# Patient Record
Sex: MALE | Race: BLACK OR AFRICAN AMERICAN | NOT HISPANIC OR LATINO | Employment: FULL TIME | URBAN - METROPOLITAN AREA
[De-identification: names, ages, dates, MRNs, and addresses within clinical notes are randomized per-mention and may not be internally consistent; named-entity substitution may affect disease eponyms.]

---

## 2023-06-21 LAB
ALANINE AMINOTRANSFERASE (SGPT) (U/L) IN SER/PLAS: 24 U/L (ref 10–52)
ALBUMIN (G/DL) IN SER/PLAS: 4.3 G/DL (ref 3.4–5)
ALKALINE PHOSPHATASE (U/L) IN SER/PLAS: 53 U/L (ref 33–120)
ANION GAP IN SER/PLAS: 13 MMOL/L (ref 10–20)
ASPARTATE AMINOTRANSFERASE (SGOT) (U/L) IN SER/PLAS: 22 U/L (ref 9–39)
BILIRUBIN TOTAL (MG/DL) IN SER/PLAS: 0.9 MG/DL (ref 0–1.2)
CALCIDIOL (25 OH VITAMIN D3) (NG/ML) IN SER/PLAS: 10 NG/ML
CALCIUM (MG/DL) IN SER/PLAS: 9.5 MG/DL (ref 8.6–10.6)
CARBON DIOXIDE, TOTAL (MMOL/L) IN SER/PLAS: 29 MMOL/L (ref 21–32)
CHLORIDE (MMOL/L) IN SER/PLAS: 101 MMOL/L (ref 98–107)
CHOLESTEROL (MG/DL) IN SER/PLAS: 169 MG/DL (ref 0–199)
CHOLESTEROL IN HDL (MG/DL) IN SER/PLAS: 58.2 MG/DL
CHOLESTEROL/HDL RATIO: 2.9
CREATININE (MG/DL) IN SER/PLAS: 1.19 MG/DL (ref 0.5–1.3)
ERYTHROCYTE DISTRIBUTION WIDTH (RATIO) BY AUTOMATED COUNT: 15.3 % (ref 11.5–14.5)
ERYTHROCYTE MEAN CORPUSCULAR HEMOGLOBIN CONCENTRATION (G/DL) BY AUTOMATED: 32.1 G/DL (ref 32–36)
ERYTHROCYTE MEAN CORPUSCULAR VOLUME (FL) BY AUTOMATED COUNT: 98 FL (ref 80–100)
ERYTHROCYTES (10*6/UL) IN BLOOD BY AUTOMATED COUNT: 4.84 X10E12/L (ref 4.5–5.9)
GFR MALE: 71 ML/MIN/1.73M2
GLUCOSE (MG/DL) IN SER/PLAS: 108 MG/DL (ref 74–99)
HEMATOCRIT (%) IN BLOOD BY AUTOMATED COUNT: 47.4 % (ref 41–52)
HEMOGLOBIN (G/DL) IN BLOOD: 15.2 G/DL (ref 13.5–17.5)
LDL: 97 MG/DL (ref 0–99)
LEUKOCYTES (10*3/UL) IN BLOOD BY AUTOMATED COUNT: 7.8 X10E9/L (ref 4.4–11.3)
NRBC (PER 100 WBCS) BY AUTOMATED COUNT: 0 /100 WBC (ref 0–0)
PLATELETS (10*3/UL) IN BLOOD AUTOMATED COUNT: 213 X10E9/L (ref 150–450)
POTASSIUM (MMOL/L) IN SER/PLAS: 4 MMOL/L (ref 3.5–5.3)
PROSTATE SPECIFIC AG (NG/ML) IN SER/PLAS: 0.27 NG/ML (ref 0–4)
PROTEIN TOTAL: 7.6 G/DL (ref 6.4–8.2)
SODIUM (MMOL/L) IN SER/PLAS: 139 MMOL/L (ref 136–145)
THYROTROPIN (MIU/L) IN SER/PLAS BY DETECTION LIMIT <= 0.05 MIU/L: 0.95 MIU/L (ref 0.44–3.98)
TRIGLYCERIDE (MG/DL) IN SER/PLAS: 67 MG/DL (ref 0–149)
UREA NITROGEN (MG/DL) IN SER/PLAS: 15 MG/DL (ref 6–23)
VLDL: 13 MG/DL (ref 0–40)

## 2023-07-07 LAB
ACTIVATED PARTIAL THROMBOPLASTIN TIME IN PPP BY COAGULATION ASSAY: 31 SEC (ref 27–38)
ALANINE AMINOTRANSFERASE (SGPT) (U/L) IN SER/PLAS: 17 U/L (ref 10–52)
ALBUMIN (G/DL) IN SER/PLAS: 4.1 G/DL (ref 3.4–5)
ALKALINE PHOSPHATASE (U/L) IN SER/PLAS: 49 U/L (ref 33–120)
ANION GAP IN SER/PLAS: 12 MMOL/L (ref 10–20)
ASPARTATE AMINOTRANSFERASE (SGOT) (U/L) IN SER/PLAS: 20 U/L (ref 9–39)
BASOPHILS (10*3/UL) IN BLOOD BY AUTOMATED COUNT: 0.03 X10E9/L (ref 0–0.1)
BASOPHILS/100 LEUKOCYTES IN BLOOD BY AUTOMATED COUNT: 0.3 % (ref 0–2)
BILIRUBIN TOTAL (MG/DL) IN SER/PLAS: 0.9 MG/DL (ref 0–1.2)
CALCIUM (MG/DL) IN SER/PLAS: 9.4 MG/DL (ref 8.6–10.6)
CARBON DIOXIDE, TOTAL (MMOL/L) IN SER/PLAS: 29 MMOL/L (ref 21–32)
CHLAMYDIA TRACH., AMPLIFIED: NORMAL
CHLORIDE (MMOL/L) IN SER/PLAS: 101 MMOL/L (ref 98–107)
CLUE CELLS WET PREP: NORMAL
CREATININE (MG/DL) IN SER/PLAS: 1.16 MG/DL (ref 0.5–1.3)
EOSINOPHILS (10*3/UL) IN BLOOD BY AUTOMATED COUNT: 0.11 X10E9/L (ref 0–0.7)
EOSINOPHILS/100 LEUKOCYTES IN BLOOD BY AUTOMATED COUNT: 1.2 % (ref 0–6)
ERYTHROCYTE DISTRIBUTION WIDTH (RATIO) BY AUTOMATED COUNT: 15 % (ref 11.5–14.5)
ERYTHROCYTE MEAN CORPUSCULAR HEMOGLOBIN CONCENTRATION (G/DL) BY AUTOMATED: 33.2 G/DL (ref 32–36)
ERYTHROCYTE MEAN CORPUSCULAR VOLUME (FL) BY AUTOMATED COUNT: 96 FL (ref 80–100)
ERYTHROCYTES (10*6/UL) IN BLOOD BY AUTOMATED COUNT: 4.67 X10E12/L (ref 4.5–5.9)
GFR MALE: 73 ML/MIN/1.73M2
GLUCOSE (MG/DL) IN SER/PLAS: 90 MG/DL (ref 74–99)
HEMATOCRIT (%) IN BLOOD BY AUTOMATED COUNT: 44.6 % (ref 41–52)
HEMOGLOBIN (G/DL) IN BLOOD: 14.8 G/DL (ref 13.5–17.5)
HIV 1/ 2 AG/AB SCREEN: NORMAL
IMMATURE GRANULOCYTES/100 LEUKOCYTES IN BLOOD BY AUTOMATED COUNT: 0.2 % (ref 0–0.9)
INR IN PPP BY COAGULATION ASSAY: 1 (ref 0.9–1.1)
LEUKOCYTES (10*3/UL) IN BLOOD BY AUTOMATED COUNT: 9 X10E9/L (ref 4.4–11.3)
LYMPHOCYTES (10*3/UL) IN BLOOD BY AUTOMATED COUNT: 1.73 X10E9/L (ref 1.2–4.8)
LYMPHOCYTES/100 LEUKOCYTES IN BLOOD BY AUTOMATED COUNT: 19.2 % (ref 13–44)
MONOCYTES (10*3/UL) IN BLOOD BY AUTOMATED COUNT: 0.78 X10E9/L (ref 0.1–1)
MONOCYTES/100 LEUKOCYTES IN BLOOD BY AUTOMATED COUNT: 8.6 % (ref 2–10)
N. GONORRHEA, AMPLIFIED: NORMAL
NEUTROPHILS (10*3/UL) IN BLOOD BY AUTOMATED COUNT: 6.35 X10E9/L (ref 1.2–7.7)
NEUTROPHILS/100 LEUKOCYTES IN BLOOD BY AUTOMATED COUNT: 70.5 % (ref 40–80)
NRBC (PER 100 WBCS) BY AUTOMATED COUNT: 0 /100 WBC (ref 0–0)
PLATELETS (10*3/UL) IN BLOOD AUTOMATED COUNT: 181 X10E9/L (ref 150–450)
POTASSIUM (MMOL/L) IN SER/PLAS: 3.9 MMOL/L (ref 3.5–5.3)
PROTEIN TOTAL: 7.6 G/DL (ref 6.4–8.2)
PROTHROMBIN TIME (PT) IN PPP BY COAGULATION ASSAY: 11.4 SEC (ref 9.8–12.8)
SODIUM (MMOL/L) IN SER/PLAS: 138 MMOL/L (ref 136–145)
SYPHILIS TOTAL AB: NORMAL
TRICH WET PREP: NORMAL
TRICHOMONAS REFLEX COMMENT: NORMAL
UREA NITROGEN (MG/DL) IN SER/PLAS: 13 MG/DL (ref 6–23)
WBC WET PREP: NORMAL
YEAST PRESENCE WET PREP: NORMAL

## 2023-07-08 ENCOUNTER — HOSPITAL ENCOUNTER (INPATIENT)
Dept: DATA CONVERSION | Facility: HOSPITAL | Age: 58
Discharge: HOME | End: 2023-07-09
Attending: STUDENT IN AN ORGANIZED HEALTH CARE EDUCATION/TRAINING PROGRAM | Admitting: DENTIST
Payer: COMMERCIAL

## 2023-07-08 DIAGNOSIS — I50.22 CHRONIC SYSTOLIC (CONGESTIVE) HEART FAILURE (MULTI): ICD-10-CM

## 2023-07-08 DIAGNOSIS — J44.9 CHRONIC OBSTRUCTIVE PULMONARY DISEASE, UNSPECIFIED (MULTI): ICD-10-CM

## 2023-07-08 DIAGNOSIS — F17.210 NICOTINE DEPENDENCE, CIGARETTES, UNCOMPLICATED: ICD-10-CM

## 2023-07-08 DIAGNOSIS — I11.0 HYPERTENSIVE HEART DISEASE WITH HEART FAILURE (MULTI): ICD-10-CM

## 2023-07-08 DIAGNOSIS — Z88.0 ALLERGY STATUS TO PENICILLIN: ICD-10-CM

## 2023-07-08 DIAGNOSIS — K04.7 PERIAPICAL ABSCESS WITHOUT SINUS: ICD-10-CM

## 2023-07-08 DIAGNOSIS — E66.9 OBESITY, UNSPECIFIED: ICD-10-CM

## 2023-07-08 DIAGNOSIS — G47.33 OBSTRUCTIVE SLEEP APNEA (ADULT) (PEDIATRIC): ICD-10-CM

## 2023-07-08 DIAGNOSIS — R52 PAIN, UNSPECIFIED: ICD-10-CM

## 2023-07-09 LAB
APPEARANCE, URINE: NORMAL
ASCORBIC ACID: NORMAL MG/DL
BILIRUBIN, URINE: NORMAL
BLOOD, URINE: NORMAL
COLOR, URINE: NORMAL
ERYTHROCYTE DISTRIBUTION WIDTH (RATIO) BY AUTOMATED COUNT: NORMAL
ERYTHROCYTE MEAN CORPUSCULAR HEMOGLOBIN CONCENTRATION (G/DL) BY AUTOMATED: NORMAL
ERYTHROCYTE MEAN CORPUSCULAR VOLUME (FL) BY AUTOMATED COUNT: NORMAL
ERYTHROCYTES (10*6/UL) IN BLOOD BY AUTOMATED COUNT: NORMAL
GLUCOSE, URINE: NORMAL
HEMATOCRIT (%) IN BLOOD BY AUTOMATED COUNT: NORMAL
HEMOGLOBIN (G/DL) IN BLOOD: NORMAL
KETONES, URINE: NORMAL
LEUKOCYTE ESTERASE, URINE: NORMAL
LEUKOCYTES (10*3/UL) IN BLOOD BY AUTOMATED COUNT: NORMAL
NITRITE, URINE: NORMAL
NRBC (PER 100 WBCS) BY AUTOMATED COUNT: NORMAL
PH, URINE: NORMAL
PLATELETS (10*3/UL) IN BLOOD AUTOMATED COUNT: NORMAL
PROTEIN, URINE: NORMAL
SPECIFIC GRAVITY, URINE: NORMAL
UROBILINOGEN, URINE: NORMAL

## 2023-07-10 LAB
ANION GAP IN SER/PLAS: NORMAL
ANION GAP IN SER/PLAS: NORMAL
CALCIUM (MG/DL) IN SER/PLAS: NORMAL
CALCIUM (MG/DL) IN SER/PLAS: NORMAL
CARBON DIOXIDE, TOTAL (MMOL/L) IN SER/PLAS: NORMAL
CARBON DIOXIDE, TOTAL (MMOL/L) IN SER/PLAS: NORMAL
CHLORIDE (MMOL/L) IN SER/PLAS: NORMAL
CHLORIDE (MMOL/L) IN SER/PLAS: NORMAL
CREATININE (MG/DL) IN SER/PLAS: NORMAL
CREATININE (MG/DL) IN SER/PLAS: NORMAL
ERYTHROCYTE DISTRIBUTION WIDTH (RATIO) BY AUTOMATED COUNT: NORMAL
ERYTHROCYTE DISTRIBUTION WIDTH (RATIO) BY AUTOMATED COUNT: NORMAL
ERYTHROCYTE MEAN CORPUSCULAR HEMOGLOBIN CONCENTRATION (G/DL) BY AUTOMATED: NORMAL
ERYTHROCYTE MEAN CORPUSCULAR HEMOGLOBIN CONCENTRATION (G/DL) BY AUTOMATED: NORMAL
ERYTHROCYTE MEAN CORPUSCULAR VOLUME (FL) BY AUTOMATED COUNT: NORMAL
ERYTHROCYTE MEAN CORPUSCULAR VOLUME (FL) BY AUTOMATED COUNT: NORMAL
ERYTHROCYTES (10*6/UL) IN BLOOD BY AUTOMATED COUNT: NORMAL
ERYTHROCYTES (10*6/UL) IN BLOOD BY AUTOMATED COUNT: NORMAL
GFR FEMALE: NORMAL
GFR FEMALE: NORMAL
GFR MALE: NORMAL
GFR MALE: NORMAL
GLUCOSE (MG/DL) IN SER/PLAS: NORMAL
GLUCOSE (MG/DL) IN SER/PLAS: NORMAL
HEMATOCRIT (%) IN BLOOD BY AUTOMATED COUNT: NORMAL
HEMATOCRIT (%) IN BLOOD BY AUTOMATED COUNT: NORMAL
HEMOGLOBIN (G/DL) IN BLOOD: NORMAL
HEMOGLOBIN (G/DL) IN BLOOD: NORMAL
LEUKOCYTES (10*3/UL) IN BLOOD BY AUTOMATED COUNT: NORMAL
LEUKOCYTES (10*3/UL) IN BLOOD BY AUTOMATED COUNT: NORMAL
NRBC (PER 100 WBCS) BY AUTOMATED COUNT: NORMAL
NRBC (PER 100 WBCS) BY AUTOMATED COUNT: NORMAL
PLATELETS (10*3/UL) IN BLOOD AUTOMATED COUNT: NORMAL
PLATELETS (10*3/UL) IN BLOOD AUTOMATED COUNT: NORMAL
POTASSIUM (MMOL/L) IN SER/PLAS: NORMAL
POTASSIUM (MMOL/L) IN SER/PLAS: NORMAL
SODIUM (MMOL/L) IN SER/PLAS: NORMAL
SODIUM (MMOL/L) IN SER/PLAS: NORMAL
UREA NITROGEN (MG/DL) IN SER/PLAS: NORMAL
UREA NITROGEN (MG/DL) IN SER/PLAS: NORMAL

## 2023-07-16 LAB
GRAM STAIN: ABNORMAL
TISSUE/WOUND CULTURE/SMEAR: ABNORMAL
TISSUE/WOUND CULTURE/SMEAR: ABNORMAL

## 2023-07-24 LAB
FUNGAL CULTURE/SMEAR: NORMAL
FUNGAL SMEAR: NORMAL

## 2023-09-30 NOTE — DISCHARGE SUMMARY
Send Summary:   Discharge Summary Providers:  Provider Role Provider Name   · Primary Mojgan Brown   · Attending Ignacia Pack       Note Recipients: Rubens Perdomo DMD       Discharge:    Summary:   Admission Date: .07-Jul-2023 19:25:00   Discharge Date: 09-Jul-2023   Attending Physician at Discharge: Rubens Perdomo   Admission Reason: Dental abscess   Final Discharge Diagnoses: Abscess, dental   Procedures: Date: 08-Jul-2023 10:20:00  Procedure Name: 1. Incision and drainage of Right submandibular and submental space   2. Extraction of tooth #31  3.   4.   5.   Condition at Discharge: Satisfactory   Disposition at Discharge: .Home   Vital Signs:        T   P  R  BP   MAP  SpO2   Value  36.3  97  16  130/90      95%  Date/Time 7/9 3:51 7/9 3:51 7/9 3:51 7/9 3:51    7/9 3:51  Range  (36.1C - 36.7C )  (94 - 97 )  (16 - 18 )  (130 - 164 )/ (90 - 119 )    (93% - 96% )   As of 08-Jul-2023 19:56:00, patient is on 3 L/min of oxygen via nasal cannula.    Date:            Weight/Scale Type:  Height:   08-Jul-2023 11:19  113.9  kg         175.2  cm  Physical Exam:    CONSTITUTIONAL: Well developed, awake/alert/oriented x3  EYES: PERRLA, EOMI    ORAL CAVITY: PANKAJ: 40mm, FOM soft with mild erythema, Extraction site #31 is hemostatic without s/s of infection  THROAT: Uvula Symmetric on Animation, Patient tolerating own secretions.   NECK: tender to palpation submentally, border of mandible palpable bilaterally. submental edema consistent with post-op findings  CARDIOVASCULAR: No edema in hands/feet  RESPIRATORY: Non-labored breathing, good chest expansion, thorax symmetric    NEURO: CN V and VII intact   PSYCH: Appropriate mood and behavior  Hospital Course:    HPI: JEREMY DINH LEUNG is a 58 year old Male who is HOD 3, POD 1 s/p I&D of submandibular and submental abscess in the OR. He complains of mild-moderate pain, controlled  with medication. He has been ambulating, voiding and tolerating PO.     ORAL AND  MAXILLOFACIAL SURGERY PLAN    Feed - soft diet  Analgesia/sedation/anxiolytic - Moderate Pain: Tylenol 650mg, Severe Pain: Tylenol/Oxy/Dilaudid  Volume - LR   OMFS - Yankaur suction bedside. May brush teeth starting first AM after surgery, Peridex 0.12% TID rinse and spit  Respiratory - Ambulation  Infectious disease - Clindamycin, levoquin  Embolic prophylaxis - SCDs and sub-q heparin  Tubes/lines/drains - Penrose drain in R submental region     -Plan to discharge to home 7/9/23  -Patient to follow up with [facility] outpatient clinic on 7/10/23 at 9 AM for penrose drain removal            Discharge Information:    and Continuing Care:   Lab Results - Pending:    None  Radiology Results - Pending: None   Discharge Instructions:    Activity:           activity as tolerated.          May shower..  Avoid direct streams of water to drain site          May not return to school/work until follow-up visit with.  [facility] oupatient  Instructions:  on 7/10/23 at 9 AM (walk in)          May not drive while taking narcotics.  Do not drive while taking oxy          No pushing, pulling, or lifting objects greater than 10 pounds.      Nutrition/Diet:           regular          Diet Consistency/Texture:   soft    Wound Care:           Wound Site:   R neck incision, extraction site on lower right (intraoral)          Wound Type:   surgical incision          Change Dressing:   as needed          Cleanse With:   soap and water,  Peridex for intraoral use          Apply:   Bacitracin,  switch to vaseline after 3 days          Cover With:   4x4 gauze          Tape With:   paper tape          Instructions:   no lotions, creams, or tub soaks    Drain/Tube Care:           Type:   Penrose          Site:   R neck          Suction:   gravity          Cleanse With:   soap and water          Dress With:   4x4 gauze dressing    Additional Orders:           Additional Instructions:   Follow up 1 week after surgery. Call  [phone] to schedule.  Clinic located at [address] [place] [address] inside the [facility] building.     Post-Operative Instructions  Jaw-Facial Surgery    Please read your instructions carefully and call with any questions.    FOLLOW UP You will be seen within 1 week after surgery.     Position When sleeping  elevate your head and back with several pillows for the first 1-2 days after surgery. Lie on your back, rather than on your sides or stomach.      Breathing It is extremely important that you take deep breaths after surgery to prevent from developing pneumonia, which is a common occurrence after surgery if breathing exercises are not followed properly.  A good example is to take 10-20 deep breaths  every hour, while you are awake, as well as encouraging yourself to cough.     Ice Ice packs or cool damp wash clots should be applied on or around the treated areas for the first 24 hours after surgery, especially over the eyes and cheeks.  This will lessen the amount of swelling, bruising, and pain.  If no bandages are present,  place a cloth between the skin and the ice pack to protect the skin.  Use ice for up to 45 minutes out of each waking hour for the first 24 hours.  An easy schedule to follow is 20 minutes on and 20 minutes off.    Heat You may begin heat packs after ice packs are stopped.  This is helpful to minimize and clear up bruising that may appear 2-3 days after the surgery. The moist heat will increase the circulation and help the body rid itself of swelling and bruising.   DO NOT heat continuously. Use heat a maximum of 20 minutes per hour.  The temperature should be closely monitored, NEVER set an electric heating pad above a medium setting.  Any numbness in treated areas make it possible to burn your skin without knowing.   To prevent serious injury from your electric heating pad BE CERTAIN it is approved for use with moisture.  Heat may be used until the swelling and bruising have resolved.       Diet First Day:   A liquid diet is recommended. We will provide a feeding catheter for your comfort if needed.   First Week: A liquid diet should be followed, ABSOLUTELY NO CHEWING.   After One Week: Your doctor will examine the healing process and advise you as to when you may slowly resume your regular diet.  Soft diet is often required for many weeks.    Oral Care Begin oral hygiene brushing and oral rinses IMMEDIATELY 24 hours after surgery.  Use the prescribed medicated mouth rinse.  Follow directions on bottle.  Rinse gently with a capful of this solution 4-5 times a day.  It is imperative that  you begin brushing your teeth in addition to the medicated mouth rinse.  A gentle baby or children?s toothbrush may be used on your teeth especially in the hard to reach posterior areas. When you do begin gentle brushing of your teeth, make sure  that you do not brush across the incisions with the bristles.     Your surgery was accomplished by incisions inside your mouth and on the outside at the area of the right neck.  Do not disturb sutures with your tongue.  The only care necessary for intra-oral sutures is the mouth rinse described above.    Activity (I)  During your first post-operative day, you should sit, stand, or walk around rather than remain in bed.  However, you should rest when tired.  (II)  Avoid bending over or lifting more than 5 pounds during the first week.    Sports No strenuous physical activity for the first 4 weeks.  Prolonged duration may be recommended by your Surgeon. This precaution is to protect your head and neck from bumps, hits or injuries. No swimming, fitness or strenuous activities for first  4 weeks.  No diving, biking or skiing for two months.  Passive exercise is permitted, like walking.  Any other questions should be directed to your Surgeon.    Hair Care You may wash your hair after surgery.  A mild baby shampoo is recommended.  Please have a family member or aide nearby as  "the hot water may cause you to feel light headed and weak.    Cosmetics You may apply cosmetics to untreated areas following surgery.      Sun Exposure Protect your facial skin from excessive sun exposure as long as the treated area(s) are still pink.  When the treated area(s) are no longer pink, ordinary exposure is not harmful, but a sunscreen should always be used.    Medications some medications may not be available in liquid form; please obtain a ?pill ? that can help to crush needed medications and can be taken with liquids/soft foods.     Pain Reliever:  take one tablet when you arrive at home.  Additional tablets may be taken every 4-6 hours as needed for pain relief.  CAUTION:  Do not drive or operate machinery while taking pain relievers.  Take with food or liquid to avoid nausea.   Antibiotics:  if prescribed, take them as indicated on the bottle and complete the full course of pills until finished.   Aspirin:  avoid taking aspiring or aspiring containing compounds during your first week after surgery.   Anti-swelling:  (dexamethasone). If prescribed, this will be dispensed as a ?dose pack? with a tapering protocol. Please follow the instructions on the packaging for optimal effects.  These will help to reduce the amount of local swelling  and speed up recovery.    PLEASE REPORT ANY OF THE FOLLOWING TO OUR OFFICE:    A. Sudden or excessive bleeding, swelling, or bruising.  B. Any itching, rash or reaction to medications.  C. Fever, temperature over 101 degrees (taken orally).  D. Discharge from the incision (other than blood).  E. ANY INJURY TO THE FACE.      Faithful adherence to pre-operative and post-operative instructions will help to minimize swelling, pain and discomfort. If you do have any problems, please do not hesitate to contact me for assistance. Resident on Call 24/7 at [phone] and ask for  \"Oral Surgeon On Call.\".      Follow Up Appointments:    7/10/23 at 9 AM (WALK IN)     The " Oral & Maxillofacial Surgery clinic is located on the first floor within the [facility] ([address] [id number]).  Our normal telephone number is [phone]. For any emergency or after hours  questions, contact the resident on call - [phone] for the hospital  and ask for the ?Oral Surgeon On Call?..  Discharge Medications: Home Medication   losartan 100 mg oral tablet - 1 tab(s) orally once a day  metoprolol succinate 50 mg oral tablet, extended release - 1 tab(s) orally once a day  Symbicort 160 mcg-4.5 mcg/inh inhalation aerosol - 2 puff(s) inhaled 2 times a day  amLODIPine 10 mg oral tablet - 1 tab(s) orally once a day  acetaminophen 650 mg oral tablet, extended release - 1 tab(s) orally every 6 hours   bacitracin 500 units/g topical ointment - Apply topically to affected area 3 times a day   oxyCODONE 5 mg oral tablet - 1 tab(s) orally every 6 hours   Cleocin HCl 300 mg oral capsule - 1 cap(s) orally every 6 hours   levoFLOXacin 750 mg oral tablet - 1 tab(s) orally once a day   Peridex 0.12% mucous membrane liquid - 15 milliliter(s) orally 3 times a day   tamsulosin 0.4 mg oral capsule - 1 cap(s) orally once a day     PRN Medication   albuterol 0.63 mg/3 mL (0.021%) inhalation solution - 3 milliliter(s) by nebulizer every 6 hours, As Needed   ZzzQuil 50 mg/30 mL oral liquid - 30 milliliter(s) orally once a day (at bedtime), As Needed     DNR Status:   ·  Code Status Code Status order at time of discharge: Full Code     Attestation:   Note Completion:  I am a:  Resident/Fellow   Attending Attestation I saw and evaluated the patient.  I personally obtained the key and critical portions of the history and physical exam or was physically present for key and  critical portions performed by the resident/fellow. I reviewed the resident/fellow?s documentation and discussed the patient with the resident/fellow.  I agree with the resident/fellow?s  medical decision making as documented in the note.     I personally evaluated the patient on 09-Jul-2023         Electronic Signatures:  Silvia Shultz (DDS (Resident))  (Signed 09-Jul-2023 08:37)   Authored: Send Summary, Summary Content, Ongoing Care,  DNR Status, Note Completion  Rubens Perdomo (DMD)  (Signed 25-Jul-2023 21:15)   Authored: Summary Content, Ongoing Care, Note Completion   Co-Signer: Send Summary, Summary Content, Ongoing Care, DNR Status, Note Completion      Last Updated: 25-Jul-2023 21:15 by Rubens Perdomo (DMD)

## 2023-09-30 NOTE — H&P
History & Physical Reviewed:   I have reviewed the History and Physical dated:  08-Jul-2023   History and Physical reviewed and relevant findings noted. Patient examined to review pertinent physical  findings.: No significant changes   Home Medications Reviewed: no changes noted   Allergies Reviewed: no changes noted       ERAS (Enhanced Recovery After Surgery):  ·  ERAS Patient: no     Consent:   COVID-19 Consent:  ·  COVID-19 Risk Consent Surgeon has reviewed key risks related to the risk of anika COVID-19 and if they contract COVID-19 what the risks are.     Attestation:   Note Completion:  I am a:  Resident/Fellow   Attending Attestation I saw and evaluated the patient.  I personally obtained the key and critical portions of the history and physical exam or was physically present for key and  critical portions performed by the resident/fellow. I reviewed the resident/fellow?s documentation and discussed the patient with the resident/fellow.  I agree with the resident/fellow?s medical decision making as documented in the note.     I personally evaluated the patient on 08-Jul-2023         Electronic Signatures:  Christy Dupont (Resident))  (Signed 08-Jul-2023 08:13)   Authored: History & Physical Reviewed, ERAS, Consent,  Note Completion  Rubens Perdomo (DMD)  (Signed 25-Jul-2023 21:13)   Authored: Note Completion   Co-Signer: History & Physical Reviewed, ERAS, Consent, Note Completion      Last Updated: 25-Jul-2023 21:13 by Rubens Perdomo (DMD)

## 2023-09-30 NOTE — PROGRESS NOTES
Service: Oral & Maxillofacial Surgery     Subjective Data:   DINH LUNA is a 58 year old Male who is Hospital Day # 3 and POD #1 for 1. Incision and drainage of Right submandibular and submental space ;2. Extraction of tooth #31;3. ;4. ;5.    Overnight Events: Patient had an uneventful night.   Additional Information:    HPI: DINH LUNA is a 58 year old Male who is HOD 3, POD 1 s/p I&D of submandibular and submental abscess in the OR. He complains of mild-moderate pain, controlled  with medication. He has been ambulating, voiding and tolerating PO.     Objective Data:     Objective Information:        T   P  R  BP   MAP  SpO2   Value  36.3  97  16  130/90      95%  Date/Time 7/9 3:51 7/9 3:51 7/9 3:51 7/9 3:51    7/9 3:51  Range  (36.1C - 36.7C )  (94 - 97 )  (16 - 18 )  (130 - 164 )/ (90 - 119 )    (93% - 96% )   As of 08-Jul-2023 19:56:00, patient is on 3 L/min of oxygen via nasal cannula.        Pain reported at 7/8 12:53: 10 = Severe         Weights   7/8 11:19: Weight in kg (Weight (kg))  113.9  7/8 11:19: Weight in lbs ((lbs))  251.1  7/8 11:19: BMI (kg/m2) (BMI (kg/m2))  37.106      ---- Intake and Output  -----  Mn/Dy/Year Time  Intake   Output  Net  Jul 9, 2023 6:00 am  0   0  0  Jul 8, 2023 2:00 pm  600   2  598    The Intake and Output Totals for the last 24 hours are:      Intake   Output  Net      600   2  598         Intake                                   Output      IV Fluids              600 mL               Blood                  2 mL    Physical Exam Narrative:  ·  Physical Exam:    CONSTITUTIONAL: Well developed, awake/alert/oriented x3  EYES: PERRLA, EOMI    ORAL CAVITY: PANKAJ: 40mm, FOM soft with mild erythema, Extraction site #31 is hemostatic without s/s of infection  THROAT: Uvula Symmetric on Animation, Patient tolerating own secretions.   NECK: tender to palpation submentally, border of mandible palpable bilaterally. submental edema consistent with post-op  findings  CARDIOVASCULAR: No edema in hands/feet  RESPIRATORY: Non-labored breathing, good chest expansion, thorax symmetric    NEURO: CN V and VII intact   PSYCH: Appropriate mood and behavior    Medication:    Medications:      ANTI-INFECTIVES:    1. Clindamycin 600 mg IVPB/ Premixed Soln 50 mL:  50  mL  IntraVenous Piggyback  Every 8 Hours    2. levoFLOXacin (LEVAQUIN) 750 mg IVPB/ Premix 150 mL:  150  mL  IntraVenous Piggyback  Every 24 Hours      CARDIOVASCULAR AGENTS:    1. hydrALAZINE (APRESOLINE) Injectable:  10  mg  IntraVenous Push  Once      CENTRAL NERVOUS SYSTEM AGENTS:    1. Acetaminophen Oral Liquid:  650  mg  Oral  Every 6 Hours   PRN       2. Ibuprofen Oral Liquid:  600  mg  Oral  Every 6 Hours   PRN       3. oxyCODONE Oral Liquid:  5  mg  Oral  Every 4 Hours   PRN       4. oxyCODONE Oral Liquid:  10  mg  Oral  Every 4 Hours   PRN       5. Ondansetron Injectable:  4  mg  IntraVenous Push  Every 6 Hours   PRN         COAGULATION MODIFIERS:    1. Enoxaparin SubCutaneous:  40  mg  SubCutaneous  Every 24 Hours      TOPICAL AGENTS:    1. Chlorhexidine Gluconate 0.12% Mucous Mem:  15  mL  Topical  3 Times a Day After Meals      Recent Lab Results:    Results:        I have reviewed these laboratory results:    Culture, Miscellaneous, includes Gram Stain  08-Jul-2023 09:31:00      Result Value    Gram Stain  1+ GRANULOCYTES. 1+ GRAM (+) COCCI      Complete Blood Count + Differential  07-Jul-2023 20:14:00      Result Value    White Blood Cell Count  9.0    Nucleated Erythrocyte Count  0.0    Red Blood Cell Count  4.67    HGB  14.8    HCT  44.6    MCV  96    MCHC  33.2    PLT  181    RDW-CV  15.0   H   Neutrophil %  70.5    Immature Granulocytes %  0.2    Lymphocyte %  19.2    Monocyte %  8.6    Eosinophil %  1.2    Basophil %  0.3    Neutrophil Count  6.35    Lymphocyte Count  1.73    Monocyte Count  0.78    Eosinophil Count  0.11    Basophil Count  0.03        Radiology Results:    Results:         Impression:    1. Periapical abscess involving a mandibular right posterior molar  tooth with erosion of the lingual cortex of the mandible. Effacement  of the right submandibular space, enhancement and thickening of the  soft tissue and musculature, concerning for cellulitis/deep soft  tissue infection. 14 x 6 x 6 mm focus of fluid within the inflamed  floor of the mouth, suspicious for early/developing abscess.  2. Prominent right level 1 lymph nodes are likely reactive.         CT Facial Bones with Contrast [Jul 7 2023 11:57PM]      Assessment and Plan:   Comorbidities:  ·  Comorbidity Other     Code Status:  ·  Code Status Full Code     Assessment:    HPI: DINH LUNA is a 58 year old Male who is HOD 3, POD 1 s/p I&D of submandibular and submental abscess in the OR. He complains of mild-moderate pain, controlled  with medication. He has been ambulating, voiding and tolerating PO.     ORAL AND MAXILLOFACIAL SURGERY PLAN    Feed - soft diet  Analgesia/sedation/anxiolytic - Moderate Pain: Tylenol 650mg, Severe Pain: Tylenol/Oxy/Dilaudid  Volume - LR   OMFS - Yankaur suction bedside. May brush teeth starting first AM after surgery, Peridex 0.12% TID rinse and spit  Respiratory - Ambulation  Infectious disease - Clindamycin, levoquin  Embolic prophylaxis - SCDs and sub-q heparin  Tubes/lines/drains - Penrose drain in R submental region     -Plan to discharge to home 7/9/23  -Patient to follow up with [facility] outpatient clinic on 7/10/23 at 9 AM for penrose drain removal    The Oral & Maxillofacial Surgery clinic is located on the first floor within the [facility] ([address] [place] [address]).  Our normal telephone number is [phone]. For any emergency or after hours  questions, contact the resident on call - [phone] for the hospital  and ask for the ?Oral Surgeon On Call?..    Silvia Shultz DDS (On [facility])  Oral & Maxillofacial  Surgery  Team Pager: [phone].          Attestation:   Note Completion:  I am a:  Resident/Fellow   Attending Attestation I saw and evaluated the patient.  I personally obtained the key and critical portions of the history and physical exam or was physically present for key and  critical portions performed by the resident/fellow. I reviewed the resident/fellow?s documentation and discussed the patient with the resident/fellow.  I agree with the resident/fellow?s medical decision making as documented in the note.     I personally evaluated the patient on 09-Jul-2023         Electronic Signatures:  Silvia Shultz (DDS (Resident))  (Signed 09-Jul-2023 08:11)   Authored: Service, Subjective Data, Objective Data, Assessment  and Plan, Note Completion  Rubens Perdomo (DMD)  (Signed 25-Jul-2023 21:14)   Authored: Note Completion   Co-Signer: Assessment and Plan, Note Completion      Last Updated: 25-Jul-2023 21:14 by Rubens Perdomo (NORY)

## 2023-09-30 NOTE — H&P
History of Present Illness:   HPI:    CC: dental abscess    HPI: DINH LUNA is a 58 year old Male who presents with complaints of increasing submandibular edema and tenderness for the last two days with dysphagia to food. Endorses difficulty with speech. Denies recent tooth ache or dental procedures.  Presented to urgent care who sent him to ED due to concern for swelling.     Past Medical Hx: COPD, HTN  Past Surgical History: none  Allergies: PCN  Medications: metoprolol, amlodipine, losartan, tamsulosin   Social Hx: Alcohol - 1/2 pint of fallon and 3-4 beers per week. Tobacco - 3-4 cigarettes per day x 20 years       Review of Systems (Negative unless otherwise noted):    GENERAL: Denies weight loss/gain, apetite changes. Endorses fever/chills/night sweats  EYES/VISION: Denies visual changes, double vision  HEENT: Denies changes in hearing. Endorses submental pain, difficulty swallowing  PULMONARY: Denies SOB, cough.  CARDIOVASCULAR: Denies chest pain, palpitations.  GASTROINTESTINAL: Denies abdominal pain, nausea/vomiting.   NEUROLOGICAL: Denies numbness/weakness, headache, seizures    Physical Exam:    CONSTITUTIONAL: Well developed, awake/alert/oriented x3  EYES: PERRLA, EOMI    ORAL CAVITY: PANKAJ: 30mm, Noted intraoral swelling present at right FOM, erythematous, fluctuant, FOM tender to palpation, purulence was expressed with palpation of site. Mobile Teeth: 31 with class I.   THROAT: Uvula Symmetric on Animation, Patient tolerating own secretions.   NECK: tender to palpation submentally, border of mandible palpable bilaterally.  CARDIOVASCULAR: No edema in hands/feet  RESPIRATORY: Non-labored breathing, good chest expansion, thorax symmetric    NEURO: CN V and VII intact   PSYCH: Appropriate mood and behavior     Labs/Imaging:  CT facial bones with contrast  Periapical abscess involving a mandibular right posterior molar tooth with erosion of the lingual cortex of the mandible. Effacement of the  right submandibular space, enhancement and thickening of the soft tissue and musculature, concerning for cellulitis/deep  soft tissue infection. 14 x 6 x 6 mm focus of fluid within the inflamed floor of the mouth, suspicious for early/developing abscess.    Assessment/Plan:     Patient is a 58 year old man who presents with 2 days of increasing submandibular swelling, neck pain, and dysphagia with CT findings concerning for right odontogenic submandibular abscess. Patiently currently tolerating own secretions without acute concern  for airway compromise at this time. Plan for OR for I&D and extraction of right second molar.     Plan:  - Admit to [facility]  - Clindamycin 300 q8h, Levofloxacin 750 QD, Decadron 8mg q8h   - NPO for OR, posted as add on for 7/8 with Dr. Conor Reyes MD   PGY1  [id number]           Comorbidities:   Comorbidites:  ·  Comorbid Conditions hypertension            Allergies:  ·  penicillin : Rash    Medications Prior to Admission:   Admission Medication Reconciliation has not been completed for this patient.    Objective:     Objective Information:      T   P  R  BP   MAP  SpO2   Value  36.5  77  18  151/95      98%  Date/Time 7/7 19:28 7/7 19:28 7/7 19:28 7/7 19:28    7/7 19:28  Range  (36.5C - 36.5C )  (77 - 77 )  (18 - 18 )  (151 - 151 )/ (95 - 95 )    (98% - 98% )      Pain reported at 7/7 19:28: 8 = Severe    Recent Lab Results:    Results:    CBC: 7/7/2023 20:14              \     Hgb     /                              \     14.8       /  WBC  ----------------  Plt               9.0       ----------------    181              /     Hct     \                              /     44.6       \            RBC: 4.67     MCV: 96     Neutrophil %: 70.5      CMP: 7/7/2023 20:14  NA+        Cl-     BUN  /                         138    101    13  /  --------------------------------  Glucose                ---------------------------  90    K+     HCO3-   Creat \                          3.9    29    1.16  \           \  T Bili  /                    \  0.9  /  AST  x ---- x ALT        20 x ---- x 17         /  Alk P   \               /  49  \  Calcium : 9.4     Anion Gap : 12     Albumin : 4.1     T Protein : 7.6           Coagulation: 7/7/2023 20:14  PT  /                    11.4  /  -------<    INR          ----------<      1.0  PTT\                    31  \                       Attestation:   Note Completion:  I am a:  Resident/Fellow   Attending Attestation I saw and evaluated the patient.  I personally obtained the key and critical portions of the history and physical exam or was physically present for key and  critical portions performed by the resident/fellow. I reviewed the resident/fellow?s documentation and discussed the patient with the resident/fellow.  I agree with the resident/fellow?s medical decision making as documented in the note.     I personally evaluated the patient on 08-Jul-2023         Electronic Signatures:  Rubens Perdomo (DMD)  (Signed 25-Jul-2023 21:12)   Authored: Note Completion   Co-Signer: History of Present Illness, Comorbidities, Note Completion  Linda Reyes (Resident))  (Signed 08-Jul-2023 02:48)   Authored: History of Present Illness, Comorbidities,  Allergies, Medications Prior to Admission, Objective, Note Completion      Last Updated: 25-Jul-2023 21:12 by Rubens Perdomo (NORY)

## 2023-10-02 NOTE — OP NOTE
Post Operative Note:     PreOp Diagnosis: Submental and submandibular abscess   Post-Procedure Diagnosis: Submandibular and submental  abscess   Procedure: 1. Incision and drainage of Right submandibular  and submental space   2. Extraction of tooth #31  3.   4.   5.   Surgeon: Dr. Rubens Perdomo   Resident/Fellow/Other Assistant: Dr. Efra Martinez,  Dr. Christy Dupont   Estimated Blood Loss (mL): 2 cc   Specimen: yes   Findings: Purulent drainage and decayed tooth   Patient Returned To/Condition: Admitted to [place] [id number]   Drains and/or Catheters: Right submandibular penrose  drain placed     Operative Report Dictated:  Dictation: not applicable - note contains Operative  Report   Operative Report:    INTRAORAL INCISION & DRAINAGE PROCEDURE NOTE:  Risks, benefits alternatives of incision and drainage discussed with patient, including but not limited to pain, bleeding, swelling, and possible nerve damage. Verbal and written consent obtained and signed.  Anesthetized patient with 8 cc of 1% lidocaine  with 1:100,000 epinephrine. Incision was made 2 cm inferior to right inferior mandibular border using a 15 blade through the periosteum. Blunt dissection performed with hemostat through the platysma layer, manual pressure applied at the site of swelling.  Minimal Purulence noted.  Culture from right submandibular space obtained. Blunt dissection continued with a curved hemostat to expose additional abscess pockets. 1cm lingual perforation visualized intra-orally. Perforation was present prior to surgery,  intra-oral draining took place over night spontaneously. Area of infection thoroughly drained with digital pressure. Area was then copiously irrigated with normal saline. No additional purulence noted. Penrose drain inserted into incision site and secured  with prolene suture. Penrose trimmed to protrude 2 cm above incision site. Guaze placed with silk tape to cover drain and instructed to change PRN. Kerlix wrap  used as guaze with pressure for extraction site hemostasis. Post-operative instructions given  to care nurse in PACU.     Patient was admitted to [facility] service.   If you have any questions or concerns please contact us during regular office hours ([phone]). The clinic is located within the [facility], ask the  for the Oral & Maxillofacial Surgery department ([address] [place] [address]). For any emergency or after hours questions do not hesitate to contact the resident on call. You may call [phone] for the hospital  and ask for the ?Oral Surgeon On Call?.    Christy Dupont DDS  Oral & Maxillofacial Surgery, PGY-1  Available on [employer]  Team Pager: [phone].    Attestation:   Note Completion:  I am a: Resident/Fellow   Attending Attestation I was present for the entire procedure          Electronic Signatures:  Christy Dupont (MD (Resident))  (Signed 08-Jul-2023 10:31)   Authored: Post Operative Note, Note Completion  Rubens Perdomo (NORY)  (Signed 25-Jul-2023 21:14)   Authored: Note Completion   Co-Signer: Post Operative Note, Note Completion      Last Updated: 25-Jul-2023 21:14 by Rubens Perdomo (NORY)

## 2024-01-02 ENCOUNTER — HOSPITAL ENCOUNTER (OUTPATIENT)
Dept: CARDIOLOGY | Facility: HOSPITAL | Age: 59
Discharge: HOME | End: 2024-01-02
Payer: COMMERCIAL

## 2024-01-02 DIAGNOSIS — I10 HTN (HYPERTENSION): ICD-10-CM

## 2024-01-02 DIAGNOSIS — R06.02 SOB (SHORTNESS OF BREATH): ICD-10-CM

## 2024-01-02 PROCEDURE — 93016 CV STRESS TEST SUPVJ ONLY: CPT | Performed by: INTERNAL MEDICINE

## 2024-01-02 PROCEDURE — 93017 CV STRESS TEST TRACING ONLY: CPT

## 2024-01-02 PROCEDURE — 93018 CV STRESS TEST I&R ONLY: CPT | Performed by: INTERNAL MEDICINE

## 2024-07-15 ENCOUNTER — APPOINTMENT (OUTPATIENT)
Dept: RADIOLOGY | Facility: HOSPITAL | Age: 59
End: 2024-07-15
Payer: COMMERCIAL

## 2024-07-15 ENCOUNTER — APPOINTMENT (OUTPATIENT)
Dept: CARDIOLOGY | Facility: HOSPITAL | Age: 59
End: 2024-07-15
Payer: COMMERCIAL

## 2024-07-15 ENCOUNTER — HOSPITAL ENCOUNTER (EMERGENCY)
Facility: HOSPITAL | Age: 59
Discharge: HOME | End: 2024-07-15
Attending: STUDENT IN AN ORGANIZED HEALTH CARE EDUCATION/TRAINING PROGRAM
Payer: COMMERCIAL

## 2024-07-15 VITALS
HEART RATE: 102 BPM | RESPIRATION RATE: 21 BRPM | BODY MASS INDEX: 35.55 KG/M2 | OXYGEN SATURATION: 94 % | SYSTOLIC BLOOD PRESSURE: 164 MMHG | WEIGHT: 240 LBS | HEIGHT: 69 IN | DIASTOLIC BLOOD PRESSURE: 121 MMHG | TEMPERATURE: 96.8 F

## 2024-07-15 DIAGNOSIS — J44.1 COPD EXACERBATION (MULTI): Primary | ICD-10-CM

## 2024-07-15 LAB
ALBUMIN SERPL-MCNC: 3.8 G/DL (ref 3.5–5)
ALP BLD-CCNC: 57 U/L (ref 35–125)
ALT SERPL-CCNC: 35 U/L (ref 5–40)
ANION GAP SERPL CALC-SCNC: 10 MMOL/L
AST SERPL-CCNC: 37 U/L (ref 5–40)
BASOPHILS # BLD MANUAL: 0.07 X10*3/UL (ref 0–0.1)
BASOPHILS NFR BLD MANUAL: 2 %
BILIRUB SERPL-MCNC: 0.5 MG/DL (ref 0.1–1.2)
BUN SERPL-MCNC: 9 MG/DL (ref 8–25)
CALCIUM SERPL-MCNC: 9 MG/DL (ref 8.5–10.4)
CHLORIDE SERPL-SCNC: 99 MMOL/L (ref 97–107)
CO2 SERPL-SCNC: 29 MMOL/L (ref 24–31)
CREAT SERPL-MCNC: 1.1 MG/DL (ref 0.4–1.6)
DACRYOCYTES BLD QL SMEAR: ABNORMAL
EGFRCR SERPLBLD CKD-EPI 2021: 77 ML/MIN/1.73M*2
EOSINOPHIL # BLD MANUAL: 0.07 X10*3/UL (ref 0–0.7)
EOSINOPHIL NFR BLD MANUAL: 2 %
ERYTHROCYTE [DISTWIDTH] IN BLOOD BY AUTOMATED COUNT: 15.5 % (ref 11.5–14.5)
FLUAV RNA RESP QL NAA+PROBE: NOT DETECTED
FLUBV RNA RESP QL NAA+PROBE: NOT DETECTED
GLUCOSE SERPL-MCNC: 121 MG/DL (ref 65–99)
HCT VFR BLD AUTO: 47 % (ref 41–52)
HGB BLD-MCNC: 15 G/DL (ref 13.5–17.5)
IMM GRANULOCYTES # BLD AUTO: 0.01 X10*3/UL (ref 0–0.7)
IMM GRANULOCYTES NFR BLD AUTO: 0.3 % (ref 0–0.9)
LYMPHOCYTES # BLD MANUAL: 0.79 X10*3/UL (ref 1.2–4.8)
LYMPHOCYTES NFR BLD MANUAL: 24 %
MAGNESIUM SERPL-MCNC: 1.9 MG/DL (ref 1.6–3.1)
MCH RBC QN AUTO: 32 PG (ref 26–34)
MCHC RBC AUTO-ENTMCNC: 31.9 G/DL (ref 32–36)
MCV RBC AUTO: 100 FL (ref 80–100)
MONOCYTES # BLD MANUAL: 0.26 X10*3/UL (ref 0.1–1)
MONOCYTES NFR BLD MANUAL: 8 %
NEUTROPHILS # BLD MANUAL: 2.08 X10*3/UL (ref 1.2–7.7)
NEUTS BAND # BLD MANUAL: 0.03 X10*3/UL (ref 0–0.7)
NEUTS BAND NFR BLD MANUAL: 1 %
NEUTS SEG # BLD MANUAL: 2.05 X10*3/UL (ref 1.2–7)
NEUTS SEG NFR BLD MANUAL: 62 %
NRBC BLD-RTO: 0 /100 WBCS (ref 0–0)
NT-PROBNP SERPL-MCNC: 674 PG/ML (ref 0–177)
OVALOCYTES BLD QL SMEAR: ABNORMAL
PLATELET # BLD AUTO: 161 X10*3/UL (ref 150–450)
POTASSIUM SERPL-SCNC: 4 MMOL/L (ref 3.4–5.1)
PROT SERPL-MCNC: 7.6 G/DL (ref 5.9–7.9)
RBC # BLD AUTO: 4.69 X10*6/UL (ref 4.5–5.9)
RBC MORPH BLD: ABNORMAL
SARS-COV-2 RNA RESP QL NAA+PROBE: NOT DETECTED
SODIUM SERPL-SCNC: 138 MMOL/L (ref 133–145)
TOTAL CELLS COUNTED BLD: 100
TROPONIN T SERPL-MCNC: 13 NG/L
VARIANT LYMPHS # BLD MANUAL: 0.03 X10*3/UL (ref 0–0.5)
VARIANT LYMPHS NFR BLD: 1 %
WBC # BLD AUTO: 3.3 X10*3/UL (ref 4.4–11.3)

## 2024-07-15 PROCEDURE — 99284 EMERGENCY DEPT VISIT MOD MDM: CPT | Mod: 25

## 2024-07-15 PROCEDURE — 94640 AIRWAY INHALATION TREATMENT: CPT

## 2024-07-15 PROCEDURE — 2500000002 HC RX 250 W HCPCS SELF ADMINISTERED DRUGS (ALT 637 FOR MEDICARE OP, ALT 636 FOR OP/ED): Performed by: STUDENT IN AN ORGANIZED HEALTH CARE EDUCATION/TRAINING PROGRAM

## 2024-07-15 PROCEDURE — 2500000002 HC RX 250 W HCPCS SELF ADMINISTERED DRUGS (ALT 637 FOR MEDICARE OP, ALT 636 FOR OP/ED)

## 2024-07-15 PROCEDURE — 83735 ASSAY OF MAGNESIUM: CPT

## 2024-07-15 PROCEDURE — 93005 ELECTROCARDIOGRAM TRACING: CPT

## 2024-07-15 PROCEDURE — 85007 BL SMEAR W/DIFF WBC COUNT: CPT

## 2024-07-15 PROCEDURE — 80053 COMPREHEN METABOLIC PANEL: CPT

## 2024-07-15 PROCEDURE — 2500000004 HC RX 250 GENERAL PHARMACY W/ HCPCS (ALT 636 FOR OP/ED)

## 2024-07-15 PROCEDURE — 83880 ASSAY OF NATRIURETIC PEPTIDE: CPT

## 2024-07-15 PROCEDURE — 85027 COMPLETE CBC AUTOMATED: CPT

## 2024-07-15 PROCEDURE — 71045 X-RAY EXAM CHEST 1 VIEW: CPT

## 2024-07-15 PROCEDURE — 36415 COLL VENOUS BLD VENIPUNCTURE: CPT

## 2024-07-15 PROCEDURE — 87636 SARSCOV2 & INF A&B AMP PRB: CPT

## 2024-07-15 PROCEDURE — 84484 ASSAY OF TROPONIN QUANT: CPT

## 2024-07-15 PROCEDURE — 96374 THER/PROPH/DIAG INJ IV PUSH: CPT

## 2024-07-15 PROCEDURE — 71045 X-RAY EXAM CHEST 1 VIEW: CPT | Performed by: RADIOLOGY

## 2024-07-15 RX ORDER — DOXYCYCLINE 100 MG/1
100 TABLET ORAL DAILY
COMMUNITY

## 2024-07-15 RX ORDER — IPRATROPIUM BROMIDE AND ALBUTEROL SULFATE 2.5; .5 MG/3ML; MG/3ML
3 SOLUTION RESPIRATORY (INHALATION) ONCE
Status: COMPLETED | OUTPATIENT
Start: 2024-07-15 | End: 2024-07-15

## 2024-07-15 RX ORDER — IPRATROPIUM BROMIDE AND ALBUTEROL SULFATE 2.5; .5 MG/3ML; MG/3ML
3 SOLUTION RESPIRATORY (INHALATION)
Status: DISCONTINUED | OUTPATIENT
Start: 2024-07-15 | End: 2024-07-15

## 2024-07-15 RX ORDER — ALBUTEROL SULFATE 90 UG/1
2 AEROSOL, METERED RESPIRATORY (INHALATION) EVERY 4 HOURS PRN
Qty: 18 G | Refills: 0 | Status: SHIPPED | OUTPATIENT
Start: 2024-07-15 | End: 2024-08-14

## 2024-07-15 RX ORDER — PREDNISONE 50 MG/1
50 TABLET ORAL DAILY
Qty: 5 TABLET | Refills: 0 | Status: SHIPPED | OUTPATIENT
Start: 2024-07-15 | End: 2024-07-20

## 2024-07-15 RX ORDER — FUROSEMIDE 20 MG/1
20 TABLET ORAL
COMMUNITY

## 2024-07-15 RX ORDER — LOSARTAN POTASSIUM 50 MG/1
100 TABLET ORAL DAILY
COMMUNITY

## 2024-07-15 RX ORDER — AZITHROMYCIN 250 MG/1
250 TABLET, FILM COATED ORAL DAILY
Qty: 5 TABLET | Refills: 0 | Status: SHIPPED | OUTPATIENT
Start: 2024-07-15 | End: 2024-07-20

## 2024-07-15 ASSESSMENT — PAIN SCALES - GENERAL
PAINLEVEL_OUTOF10: 0 - NO PAIN
PAINLEVEL_OUTOF10: 0 - NO PAIN

## 2024-07-15 ASSESSMENT — PAIN - FUNCTIONAL ASSESSMENT: PAIN_FUNCTIONAL_ASSESSMENT: 0-10

## 2024-07-15 ASSESSMENT — LIFESTYLE VARIABLES
EVER HAD A DRINK FIRST THING IN THE MORNING TO STEADY YOUR NERVES TO GET RID OF A HANGOVER: NO
HAVE YOU EVER FELT YOU SHOULD CUT DOWN ON YOUR DRINKING: NO
EVER FELT BAD OR GUILTY ABOUT YOUR DRINKING: NO
HAVE PEOPLE ANNOYED YOU BY CRITICIZING YOUR DRINKING: NO
TOTAL SCORE: 0

## 2024-07-15 ASSESSMENT — COLUMBIA-SUICIDE SEVERITY RATING SCALE - C-SSRS
2. HAVE YOU ACTUALLY HAD ANY THOUGHTS OF KILLING YOURSELF?: NO
6. HAVE YOU EVER DONE ANYTHING, STARTED TO DO ANYTHING, OR PREPARED TO DO ANYTHING TO END YOUR LIFE?: NO
1. IN THE PAST MONTH, HAVE YOU WISHED YOU WERE DEAD OR WISHED YOU COULD GO TO SLEEP AND NOT WAKE UP?: NO

## 2024-07-15 NOTE — ED PROVIDER NOTES
HPI   Chief Complaint   Patient presents with    Shortness of Breath     Short of breath, wheezing all weekend.  Tight and audible wheezes.       HPI  Patient is a 59-year-old male with history of atrial fibrillation on Eliquis, COPD who presents to ED for worsening shortness of breath and wheezing that started 3 days ago.  Patient states he has been working outside a lot and thinks that may be exacerbating his COPD.  He does report 1 episode of chest pain this morning that he describes as pounding in his chest.  He denies any history of coronary artery disease.  He denies any recent hospitalizations or surgeries.  He denies any recent travel or sick contacts.  He denies any blood thinner use.  He denies any specific complaints of fevers or chills, headache or dizziness, abdominal pain or NVD, urinary symptoms.                  Shantel Coma Scale Score: 15                     Patient History   No past medical history on file.  No past surgical history on file.  No family history on file.  Social History     Tobacco Use    Smoking status: Not on file    Smokeless tobacco: Not on file   Substance Use Topics    Alcohol use: Not on file    Drug use: Not on file       Physical Exam   ED Triage Vitals [07/15/24 0759]   Temperature Heart Rate Respirations BP   36 °C (96.8 °F) 91 (!) 25 (!) 194/157      Pulse Ox Temp Source Heart Rate Source Patient Position   97 % Temporal Brachial Sitting      BP Location FiO2 (%)     Left arm --       Physical Exam  Vitals reviewed.   Constitutional:       Appearance: Normal appearance.   HENT:      Head: Normocephalic and atraumatic.   Eyes:      Extraocular Movements: Extraocular movements intact.   Cardiovascular:      Rate and Rhythm: Normal rate. Rhythm irregular.   Pulmonary:      Effort: Tachypnea present. No respiratory distress.      Breath sounds: Decreased air movement present. Wheezing present.   Abdominal:      General: Abdomen is flat.      Palpations: Abdomen is soft.       Tenderness: There is no abdominal tenderness.   Musculoskeletal:         General: Normal range of motion.      Cervical back: Normal range of motion and neck supple.   Skin:     General: Skin is warm and dry.   Neurological:      General: No focal deficit present.      Mental Status: He is alert and oriented to person, place, and time.   Psychiatric:         Mood and Affect: Mood normal.         Behavior: Behavior normal.      Comments: Patient is upset that his wife is currently in the ICU here.  Patient does not want to be hospitalized.         ED Course & MDM   ED Course as of 07/15/24 1141   Mon Jul 15, 2024   0809 ECG 12 lead  Performed at  0806, HR of 121, irregularly irregular, NAD, QTc 406, no sign of STEMI.    Reviewed and interpreted by me at time performed   [JM]      ED Course User Index  [JM] Caitlyn Saleh MD         Diagnoses as of 07/15/24 1141   COPD exacerbation (Multi)       Medical Decision Making  Parts of this chart have been completed using voice recognition software. Please excuse any errors of transcription.  My thought process and reason for plan has been formulated from the time that I saw the patient until the time of disposition and is not specific to one specific moment during their visit and furthermore my MDM encompasses this entire chart and not only this text box.    HPI:   Detailed above.    Exam:   A medically appropriate exam performed, outlined above, given the known history and presentation.    History obtained from:   Patient, EMR,  Family of Patient    EKG/Cardiac monitor:   Interpreted by attending physician, see their note for ED course for more detail.    Social Determinants of Health considered during this visit:   Housing, Family or social support    Medications given during visit:  Medications   methylPREDNISolone sod succinate (SOLU-Medrol) injection 125 mg (125 mg intravenous Given 7/15/24 0833)   ipratropium-albuteroL (Duo-Neb) 0.5-2.5 mg/3 mL nebulizer  solution 3 mL (3 mL nebulization Given 7/15/24 6802)   ipratropium-albuteroL (Duo-Neb) 0.5-2.5 mg/3 mL nebulizer solution 3 mL (3 mL nebulization Given 7/15/24 3378)        Diagnostic/tests:  Labs Reviewed   CBC WITH AUTO DIFFERENTIAL - Abnormal       Result Value    WBC 3.3 (*)     nRBC 0.0      RBC 4.69      Hemoglobin 15.0      Hematocrit 47.0            MCH 32.0      MCHC 31.9 (*)     RDW 15.5 (*)     Platelets 161      Immature Granulocytes %, Automated 0.3      Immature Granulocytes Absolute, Automated 0.01      Narrative:     The previously reported component Neutrophils % is no longer being reported.  The previously reported component Lymphocytes % is no longer being reported.  The previously reported component Monocytes % is no longer being reported.  The previously   reported component Eosinophils % is no longer being reported.  The previously reported component Basophils % is no longer being reported.  The previously reported component Absolute Neutrophils is no longer being reported.  The previously reported   component Absolute Lymphocytes is no longer being reported.  The previously reported component Absolute Monocytes is no longer being reported.  The previously reported component Absolute Eosinophils is no longer being reported.  The previously reported   component Absolute Basophils is no longer being reported.   COMPREHENSIVE METABOLIC PANEL - Abnormal    Glucose 121 (*)     Sodium 138      Potassium 4.0      Chloride 99      Bicarbonate 29      Urea Nitrogen 9      Creatinine 1.10      eGFR 77      Calcium 9.0      Albumin 3.8      Alkaline Phosphatase 57      Total Protein 7.6      AST 37      Bilirubin, Total 0.5      ALT 35      Anion Gap 10     N-TERMINAL PROBNP - Abnormal    PROBNP 674 (*)     Narrative:     Reference ranges are based on clinical submission data. These ranges represent the 95th percentile of normal cut-off points. As NT Pro- BNP values approach 1000 pg/ml, clinical  symptoms are more likely associated with CHF.   MANUAL DIFFERENTIAL - Abnormal    Neutrophils %, Manual 62.0      Bands %, Manual 1.0      Lymphocytes %, Manual 24.0      Monocytes %, Manual 8.0      Eosinophils %, Manual 2.0      Basophils %, Manual 2.0      Atypical Lymphocytes %, Manual 1.0      Seg Neutrophils Absolute, Manual 2.05      Bands Absolute, Manual 0.03      Lymphocytes Absolute, Manual 0.79 (*)     Monocytes Absolute, Manual 0.26      Eosinophils Absolute, Manual 0.07      Basophils Absolute, Manual 0.07      Atypical Lymphs Absolute, Manual 0.03      Total Cells Counted 100      Neutrophils Absolute, Manual 2.08      RBC Morphology See Below      Ovalocytes Few      Teardrop Cells Few     MAGNESIUM - Normal    Magnesium 1.90     SARS-COV-2 PCR - Normal    Coronavirus 2019, PCR Not Detected      Narrative:     This assay has received FDA Emergency Use Authorization (EUA) and is only authorized for the duration of time that circumstances exist to justify the authorization of the emergency use of in vitro diagnostic tests for the detection of SARS-CoV-2 virus and/or diagnosis of COVID-19 infection under section 564(b)(1) of the Act, 21 U.S.C. 360bbb-3(b)(1). This assay is an in vitro diagnostic nucleic acid amplification test for the qualitative detection of SARS-CoV-2 from nasopharyngeal specimens and has been validated for use at [facility]. Negative results do not preclude COVID-19 infections and should not be used as the sole basis for diagnosis, treatment, or other management decisions.     INFLUENZA A AND B PCR - Normal    Flu A Result Not Detected      Flu B Result Not Detected      Narrative:     This assay is an in vitro diagnostic multiplex nucleic acid amplification test for the detection and discrimination of Influenza A & B from nasopharyngeal specimens, and has been validated for use at [facility]. Negative results do not preclude Influenza  A/B infections, and should not be used as the sole basis for diagnosis, treatment, or other management decisions. If Influenza A/B and RSV PCR results are negative, testing for Parainfluenza virus, Adenovirus and Metapneumovirus is routinely performed for [facility] pediatric oncology and intensive care inpatients, and is available on other patients by placing an add-on request.   SERIAL TROPONIN, INITIAL ([facility]) - Normal    Troponin T, High Sensitivity 13     TROPONIN T SERIES, HIGH SENSITIVITY (0, 2 HR, 6 HR)    Narrative:     The following orders were created for panel order Troponin T Series, High Sensitivity (0, 2HR, 6HR).  Procedure                               Abnormality         Status                     ---------                               -----------         ------                     Serial Troponin, Initial...[[id number]]  Normal              Final result               Serial Troponin, 2 Hour ...[[id number]]                                                   Please view results for these tests on the individual orders.   SERIAL TROPONIN,  2 HOUR ([facility])      XR chest 1 view   Final Result   1.  Possible left basilar airspace disease as described. Cardiomegaly   may have slightly increased.        Signed by: Rayo Kan 7/15/2024 9:22 AM   Dictation workstation:   [id number]           Differential Diagnosis:   As I have deemed necessary from their history, physical, and studies, I have considered the following diagnoses:     ACUTE CORONARY SYNDROME  PULMONARY EMBOLISM  CHRONIC OBSTRUCTIVE PULMONARY DISEASE  ASTHMA  CONGESTIVE HEART FAILURE  PNEUMONIA  PNEUMOTHORAX  BRONCHITIS  ANEMIA    Consultations:      Procedures:      Critical Care:      Referrals:      Discharge Prescriptions:      [facility] Summary:  Patient reports moderate improvement of his symptoms after breathing treatments.  Patient also given dose of steroids.  Lab workup today is unremarkable.  Patient does have a mildly elevated BNP.  Troponin is  within normal limits.  There is no leukocytosis or anemia.  There is no major electrolyte abnormality, acute kidney injury, transaminitis.  Patient is safe for discharge with prescription of steroids and new albuterol inhaler.    We have discussed the diagnosis and risks, and we agree with discharging home to follow-up with appropriate physician as directed. We also discussed returning to the Emergency Department immediately if new or worsening symptoms occur. We have discussed the symptoms which are most concerning that necessitate immediate return. Pt symptoms have been well controlled here and the patient is safe for discharge with appropriate outpatient follow up. The patient has verbalized understanding to return to ER without delay for new or worsening pains or for any other symptoms or concerns. I utilized the discharge clinical management tool provided [employer] to help estimate risk of negative outcome for this patient.      Disposition:  The patient was discharged      Procedure  Procedures     Kaden Cordero PA-C  07/15/24 1142

## 2024-07-18 LAB
Q ONSET: 221 MS
QRS COUNT: 20 BEATS
QRS DURATION: 86 MS
QT INTERVAL: 286 MS
QTC CALCULATION(BAZETT): 406 MS
QTC FREDERICIA: 361 MS
R AXIS: 82 DEGREES
T AXIS: 270 DEGREES
T OFFSET: 364 MS
VENTRICULAR RATE: 121 BPM

## 2025-04-21 ENCOUNTER — APPOINTMENT (OUTPATIENT)
Dept: RADIOLOGY | Facility: HOSPITAL | Age: 60
End: 2025-04-21
Payer: COMMERCIAL

## 2025-04-21 ENCOUNTER — APPOINTMENT (OUTPATIENT)
Dept: CARDIOLOGY | Facility: HOSPITAL | Age: 60
End: 2025-04-21
Payer: COMMERCIAL

## 2025-04-21 ENCOUNTER — HOSPITAL ENCOUNTER (EMERGENCY)
Facility: HOSPITAL | Age: 60
Discharge: HOME | End: 2025-04-21
Attending: STUDENT IN AN ORGANIZED HEALTH CARE EDUCATION/TRAINING PROGRAM
Payer: COMMERCIAL

## 2025-04-21 VITALS
HEIGHT: 69 IN | HEART RATE: 94 BPM | SYSTOLIC BLOOD PRESSURE: 163 MMHG | DIASTOLIC BLOOD PRESSURE: 108 MMHG | WEIGHT: 245 LBS | BODY MASS INDEX: 36.29 KG/M2 | OXYGEN SATURATION: 100 % | RESPIRATION RATE: 20 BRPM | TEMPERATURE: 96.3 F

## 2025-04-21 DIAGNOSIS — J44.1 COPD EXACERBATION (MULTI): Primary | ICD-10-CM

## 2025-04-21 LAB
ALBUMIN SERPL BCP-MCNC: 4 G/DL (ref 3.4–5)
ALP SERPL-CCNC: 52 U/L (ref 33–120)
ALT SERPL W P-5'-P-CCNC: 26 U/L (ref 10–52)
ANION GAP SERPL CALCULATED.3IONS-SCNC: 12 MMOL/L (ref 10–20)
AST SERPL W P-5'-P-CCNC: 24 U/L (ref 9–39)
BASE EXCESS BLDV CALC-SCNC: 6.9 MMOL/L (ref -2–3)
BASOPHILS # BLD AUTO: 0.02 X10*3/UL (ref 0–0.1)
BASOPHILS NFR BLD AUTO: 0.3 %
BILIRUB SERPL-MCNC: 0.9 MG/DL (ref 0–1.2)
BNP SERPL-MCNC: 239 PG/ML (ref 0–99)
BODY TEMPERATURE: 37 DEGREES CELSIUS
BUN SERPL-MCNC: 8 MG/DL (ref 6–23)
CALCIUM SERPL-MCNC: 9.2 MG/DL (ref 8.6–10.3)
CARDIAC TROPONIN I PNL SERPL HS: 18 NG/L (ref 0–20)
CARDIAC TROPONIN I PNL SERPL HS: 19 NG/L (ref 0–20)
CHLORIDE SERPL-SCNC: 102 MMOL/L (ref 98–107)
CO2 SERPL-SCNC: 29 MMOL/L (ref 21–32)
CREAT SERPL-MCNC: 1 MG/DL (ref 0.5–1.3)
EGFRCR SERPLBLD CKD-EPI 2021: 87 ML/MIN/1.73M*2
EOSINOPHIL # BLD AUTO: 0.07 X10*3/UL (ref 0–0.7)
EOSINOPHIL NFR BLD AUTO: 1.1 %
ERYTHROCYTE [DISTWIDTH] IN BLOOD BY AUTOMATED COUNT: 14.9 % (ref 11.5–14.5)
GLUCOSE SERPL-MCNC: 96 MG/DL (ref 74–99)
HCO3 BLDV-SCNC: 33 MMOL/L (ref 22–26)
HCT VFR BLD AUTO: 51 % (ref 41–52)
HGB BLD-MCNC: 16.4 G/DL (ref 13.5–17.5)
IMM GRANULOCYTES # BLD AUTO: 0.02 X10*3/UL (ref 0–0.7)
IMM GRANULOCYTES NFR BLD AUTO: 0.3 % (ref 0–0.9)
INHALED O2 CONCENTRATION: 21 %
LYMPHOCYTES # BLD AUTO: 2.24 X10*3/UL (ref 1.2–4.8)
LYMPHOCYTES NFR BLD AUTO: 33.6 %
MAGNESIUM SERPL-MCNC: 1.83 MG/DL (ref 1.6–2.4)
MCH RBC QN AUTO: 31.8 PG (ref 26–34)
MCHC RBC AUTO-ENTMCNC: 32.2 G/DL (ref 32–36)
MCV RBC AUTO: 99 FL (ref 80–100)
MONOCYTES # BLD AUTO: 0.46 X10*3/UL (ref 0.1–1)
MONOCYTES NFR BLD AUTO: 6.9 %
NEUTROPHILS # BLD AUTO: 3.85 X10*3/UL (ref 1.2–7.7)
NEUTROPHILS NFR BLD AUTO: 57.8 %
NRBC BLD-RTO: 0 /100 WBCS (ref 0–0)
OXYHGB MFR BLDV: 52.4 % (ref 45–75)
PCO2 BLDV: 52 MM HG (ref 41–51)
PH BLDV: 7.41 PH (ref 7.33–7.43)
PLATELET # BLD AUTO: 185 X10*3/UL (ref 150–450)
PO2 BLDV: 33 MM HG (ref 35–45)
POTASSIUM SERPL-SCNC: 4.6 MMOL/L (ref 3.5–5.3)
PROT SERPL-MCNC: 7.6 G/DL (ref 6.4–8.2)
Q ONSET: 219 MS
QRS COUNT: 16 BEATS
QRS DURATION: 90 MS
QT INTERVAL: 354 MS
QTC CALCULATION(BAZETT): 451 MS
QTC FREDERICIA: 417 MS
R AXIS: 37 DEGREES
RBC # BLD AUTO: 5.15 X10*6/UL (ref 4.5–5.9)
SAO2 % BLDV: 54 % (ref 45–75)
SODIUM SERPL-SCNC: 138 MMOL/L (ref 136–145)
T AXIS: -54 DEGREES
T OFFSET: 396 MS
VENTRICULAR RATE: 98 BPM
WBC # BLD AUTO: 6.7 X10*3/UL (ref 4.4–11.3)

## 2025-04-21 PROCEDURE — 84484 ASSAY OF TROPONIN QUANT: CPT | Performed by: STUDENT IN AN ORGANIZED HEALTH CARE EDUCATION/TRAINING PROGRAM

## 2025-04-21 PROCEDURE — 94640 AIRWAY INHALATION TREATMENT: CPT

## 2025-04-21 PROCEDURE — 71045 X-RAY EXAM CHEST 1 VIEW: CPT | Performed by: RADIOLOGY

## 2025-04-21 PROCEDURE — 2500000002 HC RX 250 W HCPCS SELF ADMINISTERED DRUGS (ALT 637 FOR MEDICARE OP, ALT 636 FOR OP/ED): Performed by: STUDENT IN AN ORGANIZED HEALTH CARE EDUCATION/TRAINING PROGRAM

## 2025-04-21 PROCEDURE — 80053 COMPREHEN METABOLIC PANEL: CPT | Performed by: STUDENT IN AN ORGANIZED HEALTH CARE EDUCATION/TRAINING PROGRAM

## 2025-04-21 PROCEDURE — 83735 ASSAY OF MAGNESIUM: CPT | Performed by: STUDENT IN AN ORGANIZED HEALTH CARE EDUCATION/TRAINING PROGRAM

## 2025-04-21 PROCEDURE — 2500000001 HC RX 250 WO HCPCS SELF ADMINISTERED DRUGS (ALT 637 FOR MEDICARE OP): Performed by: STUDENT IN AN ORGANIZED HEALTH CARE EDUCATION/TRAINING PROGRAM

## 2025-04-21 PROCEDURE — 93005 ELECTROCARDIOGRAM TRACING: CPT

## 2025-04-21 PROCEDURE — 96374 THER/PROPH/DIAG INJ IV PUSH: CPT

## 2025-04-21 PROCEDURE — 99285 EMERGENCY DEPT VISIT HI MDM: CPT | Performed by: STUDENT IN AN ORGANIZED HEALTH CARE EDUCATION/TRAINING PROGRAM

## 2025-04-21 PROCEDURE — 83880 ASSAY OF NATRIURETIC PEPTIDE: CPT | Performed by: STUDENT IN AN ORGANIZED HEALTH CARE EDUCATION/TRAINING PROGRAM

## 2025-04-21 PROCEDURE — 71045 X-RAY EXAM CHEST 1 VIEW: CPT

## 2025-04-21 PROCEDURE — 82805 BLOOD GASES W/O2 SATURATION: CPT | Performed by: STUDENT IN AN ORGANIZED HEALTH CARE EDUCATION/TRAINING PROGRAM

## 2025-04-21 PROCEDURE — 36415 COLL VENOUS BLD VENIPUNCTURE: CPT | Performed by: STUDENT IN AN ORGANIZED HEALTH CARE EDUCATION/TRAINING PROGRAM

## 2025-04-21 PROCEDURE — 2500000004 HC RX 250 GENERAL PHARMACY W/ HCPCS (ALT 636 FOR OP/ED): Performed by: STUDENT IN AN ORGANIZED HEALTH CARE EDUCATION/TRAINING PROGRAM

## 2025-04-21 PROCEDURE — 85025 COMPLETE CBC W/AUTO DIFF WBC: CPT | Performed by: STUDENT IN AN ORGANIZED HEALTH CARE EDUCATION/TRAINING PROGRAM

## 2025-04-21 RX ORDER — MUPIROCIN 20 MG/G
1 OINTMENT TOPICAL DAILY
COMMUNITY

## 2025-04-21 RX ORDER — BUDESONIDE, GLYCOPYRROLATE, AND FORMOTEROL FUMARATE 160; 9; 4.8 UG/1; UG/1; UG/1
1 AEROSOL, METERED RESPIRATORY (INHALATION) 2 TIMES DAILY
COMMUNITY

## 2025-04-21 RX ORDER — IPRATROPIUM BROMIDE AND ALBUTEROL SULFATE 2.5; .5 MG/3ML; MG/3ML
6 SOLUTION RESPIRATORY (INHALATION) ONCE
Status: COMPLETED | OUTPATIENT
Start: 2025-04-21 | End: 2025-04-21

## 2025-04-21 RX ORDER — IPRATROPIUM BROMIDE AND ALBUTEROL SULFATE 2.5; .5 MG/3ML; MG/3ML
3 SOLUTION RESPIRATORY (INHALATION) EVERY 6 HOURS PRN
Qty: 180 ML | Refills: 0 | Status: SHIPPED | OUTPATIENT
Start: 2025-04-21 | End: 2025-05-21

## 2025-04-21 RX ORDER — ALBUTEROL SULFATE 0.83 MG/ML
2.5 SOLUTION RESPIRATORY (INHALATION) EVERY 4 HOURS PRN
COMMUNITY

## 2025-04-21 RX ORDER — METOPROLOL SUCCINATE 50 MG/1
1.5 TABLET, EXTENDED RELEASE ORAL DAILY
COMMUNITY

## 2025-04-21 RX ORDER — CLOBETASOL PROPIONATE 0.5 MG/G
1 OINTMENT TOPICAL
COMMUNITY

## 2025-04-21 RX ORDER — BENZOYL PEROXIDE 10 G/100G
GEL TOPICAL DAILY
COMMUNITY

## 2025-04-21 RX ORDER — NYSTATIN 100000 U/G
1 OINTMENT TOPICAL DAILY
COMMUNITY

## 2025-04-21 RX ORDER — PREDNISONE 50 MG/1
50 TABLET ORAL DAILY
Qty: 4 TABLET | Refills: 0 | Status: SHIPPED | OUTPATIENT
Start: 2025-04-22 | End: 2025-04-26

## 2025-04-21 RX ORDER — FUROSEMIDE 10 MG/ML
20 INJECTION INTRAMUSCULAR; INTRAVENOUS ONCE
Status: COMPLETED | OUTPATIENT
Start: 2025-04-21 | End: 2025-04-21

## 2025-04-21 RX ORDER — ALBUTEROL SULFATE 0.83 MG/ML
5 SOLUTION RESPIRATORY (INHALATION) EVERY 20 MIN
Status: DISPENSED | OUTPATIENT
Start: 2025-04-21 | End: 2025-04-21

## 2025-04-21 RX ORDER — PREDNISONE 20 MG/1
40 TABLET ORAL ONCE
Status: COMPLETED | OUTPATIENT
Start: 2025-04-21 | End: 2025-04-21

## 2025-04-21 RX ORDER — LOSARTAN POTASSIUM 50 MG/1
50 TABLET ORAL ONCE
Status: COMPLETED | OUTPATIENT
Start: 2025-04-21 | End: 2025-04-21

## 2025-04-21 RX ADMIN — IPRATROPIUM BROMIDE AND ALBUTEROL SULFATE 6 ML: 2.5; .5 SOLUTION RESPIRATORY (INHALATION) at 12:27

## 2025-04-21 RX ADMIN — FUROSEMIDE 20 MG: 10 INJECTION, SOLUTION INTRAMUSCULAR; INTRAVENOUS at 13:24

## 2025-04-21 RX ADMIN — PREDNISONE 40 MG: 20 TABLET ORAL at 12:28

## 2025-04-21 RX ADMIN — ALBUTEROL SULFATE 5 MG: 2.5 SOLUTION RESPIRATORY (INHALATION) at 15:29

## 2025-04-21 RX ADMIN — ALBUTEROL SULFATE 5 MG: 2.5 SOLUTION RESPIRATORY (INHALATION) at 14:32

## 2025-04-21 RX ADMIN — LOSARTAN POTASSIUM 50 MG: 50 TABLET, FILM COATED ORAL at 12:28

## 2025-04-21 ASSESSMENT — PAIN - FUNCTIONAL ASSESSMENT: PAIN_FUNCTIONAL_ASSESSMENT: 0-10

## 2025-04-21 ASSESSMENT — PAIN DESCRIPTION - LOCATION: LOCATION: HIP

## 2025-04-21 ASSESSMENT — PAIN DESCRIPTION - ORIENTATION: ORIENTATION: LEFT

## 2025-04-21 ASSESSMENT — COLUMBIA-SUICIDE SEVERITY RATING SCALE - C-SSRS
6. HAVE YOU EVER DONE ANYTHING, STARTED TO DO ANYTHING, OR PREPARED TO DO ANYTHING TO END YOUR LIFE?: NO
2. HAVE YOU ACTUALLY HAD ANY THOUGHTS OF KILLING YOURSELF?: NO
1. IN THE PAST MONTH, HAVE YOU WISHED YOU WERE DEAD OR WISHED YOU COULD GO TO SLEEP AND NOT WAKE UP?: NO

## 2025-04-21 ASSESSMENT — PAIN DESCRIPTION - PAIN TYPE: TYPE: ACUTE PAIN

## 2025-04-21 ASSESSMENT — PAIN SCALES - GENERAL: PAINLEVEL_OUTOF10: 8

## 2025-04-21 NOTE — ED PROVIDER NOTES
Patient was received in signout at 2:36 PM.     IN BRIEF   59-year-old male presents with complaint of shortness of breath.  He has a history of COPD.  He received several breathing treatments and a little bit of Lasix.  He is to receive some more breathing treatments and if improved will be discharged home.       ED COURSE   Patient on reassessment has improvement in his lungs on auscultation is feeling markedly improved.  He will be discharged with a short prescription for steroids.  They did contact the primary care who will prescribe a nebulizer machine.  DuoNebs are prescribed in the emergency department.        FINAL IMPRESSION      1. COPD exacerbation (Multi)          DISPOSITION    Discharge 04/21/2025 05:15:38 PM       Candice Benoit DO  04/21/25 0427

## 2025-04-21 NOTE — ED PROVIDER NOTES
"HPI   Chief Complaint   Patient presents with    Shortness of Breath     Patient reports SOB for a couple days, uses albuterol, has COPD, AMA uses CPAP.  He is diaphoretic and visibly uncomfortable.       Patient presents ED for shortness of breath for the last few days.  Notes history of COPD and has experienced increased shortness of breath and dyspnea exertion along with cough and wheezing.  Notes cough is nonproductive.  Does not appreciating retrosternal pleuritic chest pain.  Does note increased use of pulmonary medications.  Does not appreciate infectious symptoms to include fever/chills.  Denies any bilateral/unilateral leg swelling.  Notes no concerning history/red flags for DVT/PE.  Denies any changes to bowel/bladder habits.              Patient History   Medical History[1]  Surgical History[2]  Family History[3]  Social History[4]    Physical Exam   Blood pressure (!) 163/108, pulse 94, temperature 35.7 °C (96.3 °F), resp. rate 20, height 1.74 m (5' 8.5\"), weight 111 kg (245 lb), SpO2 100%.      Physical Exam  Vitals and nursing note reviewed.   Constitutional:       General: He is not in acute distress.     Appearance: Normal appearance. He is well-developed. He is obese. He is not ill-appearing.   HENT:      Mouth/Throat:      Mouth: Mucous membranes are moist.      Pharynx: Oropharynx is clear.   Eyes:      Extraocular Movements: Extraocular movements intact.      Pupils: Pupils are equal, round, and reactive to light.   Cardiovascular:      Rate and Rhythm: Normal rate and regular rhythm.      Pulses: Normal pulses.           Radial pulses are 2+ on the right side and 2+ on the left side.        Dorsalis pedis pulses are 2+ on the right side and 2+ on the left side.      Heart sounds: Normal heart sounds. Heart sounds not distant. No murmur heard.     Comments: Equal and symmetrical distal pulses  Pulmonary:      Effort: Tachypnea and respiratory distress present.      Breath sounds: No decreased air " movement. Examination of the right-upper field reveals wheezing. Examination of the left-upper field reveals wheezing. Examination of the right-middle field reveals wheezing. Examination of the left-middle field reveals wheezing. Examination of the right-lower field reveals wheezing. Wheezing present. No decreased breath sounds.      Comments: Speaking in complete sentences, minimal respiratory distress with minimal tachypnea, scattered diffuse expiratory wheezing without appreciable decreased breath sounds or aeration, not requiring any supplemental O2 at this time  Chest:      Chest wall: No tenderness.   Musculoskeletal:      Right lower leg: No edema.      Left lower leg: No edema.   Skin:     General: Skin is warm and dry.      Coloration: Skin is not ashen, cyanotic or pale.   Neurological:      General: No focal deficit present.      Mental Status: He is alert and oriented to person, place, and time.           ED Course & MDM   ED Course as of 04/25/25 0627   Mon Apr 21, 2025   1150 VS notable for significantly hypertensive and mildly tachypneic on presentation in setting reported shortness of breath, remaining VSS [BC]   1206 I personally reviewed and interpreted the EKG @1144: Afib 98, normal axis, no appreciable ischemia, PVCs, poor R wave progression in precordial leads, non-specific TW findings, and prior EKG on 7/15/2024 reviewed without any appreciable specific/identifiable changes [BC]   1302 Comprehensive metabolic panel  Unremarkable and noncontributory to Patient condition/symptoms [BC]   1303 CBC and Auto Differential(!)  Unremarkable and noncontributory to Patient condition/symptoms [BC]   1303 Blood Gas Venous(!)  No evidence of respiratory acidosis concerning for significant COPD exacerbation [BC]   1303 Magnesium  WNL [BC]   1330 XR chest 1 view  IMPRESSION:  Mild cardiomegaly.  Currently without radiographic evidence of CHF or  pneumonia.      I have personally reviewed and interpreted the  "images, cardiomegaly without evidence of pulmonary vascular congestion, no appreciable PTX or pulmonary opacities, agree with radiology final read [BC]      ED Course User Index  [BC] Sandoval Marshall MD         Diagnoses as of 04/25/25 0627   COPD exacerbation (Multi)                 No data recorded     Shantel Coma Scale Score: 15 (04/21/25 1143 : Sarahi Mccloud, RN)                           Medical Decision Making  Patient presented to the ED for worsening shortness of breath over the last few days with significant dyspnea exertion, cough and wheezing with concerning PMHx of A-fib on Eliquis, COPD, HTN, AMA on CPAP.  I personally reviewed and interpreted VS, labs, images, and EKG which are as stated above in the ED course.    Assessment/evaluation consistent with mild to moderate COPD exacerbation complicated by mild acute HF/fluid overload.  No concerning history, clinical evidence/work-up, or exam findings for the considered differentials of ACS/MI, PTX, extreme suspicion for PE, significant lecture light/metabolic arrangement, anemia.  These conditions have been thoroughly evaluated and determined to be sufficiently unlikely to be the etiology of patient's presenting symptoms.    Scores Heart 2 (1-2% MACE)      Patient signed out to oncoming provider, Dr. Candice Benoit, at 2:26 AM in stable condition.    BP (!) 163/108 (Patient Position: Lying)   Pulse 94   Temp 35.7 °C (96.3 °F)   Resp 20   Ht 1.74 m (5' 8.5\")   Wt 111 kg (245 lb)   SpO2 100%   BMI 36.71 kg/m²     Remaining workup:  Labs repeat troponin, Reassessment, and Discharge pending labs and reassessment    Patient disposition discharged home and alternative disposition medicine admission.      Per Chart Review: Nothing pertinent to this ED encounter.      Parts of this chart have been completed using voice-to-text recognition software. Please excuse any errors of transcription that were missed for editing/correcting.    Amount and/or " Complexity of Data Reviewed  Labs: ordered. Decision-making details documented in ED Course.  Radiology: ordered and independent interpretation performed. Decision-making details documented in ED Course.  ECG/medicine tests: ordered and independent interpretation performed. Decision-making details documented in ED Course.        Procedure  Procedures       [1]   Past Medical History:  Diagnosis Date    A-fib (Multi)     COPD (chronic obstructive pulmonary disease) (Multi)     Hypertension     AMA on CPAP    [2] History reviewed. No pertinent surgical history.  [3] No family history on file.  [4]   Social History  Tobacco Use    Smoking status: Some Days     Types: Cigarettes    Smokeless tobacco: Never   Vaping Use    Vaping status: Never Used   Substance Use Topics    Alcohol use: Yes    Drug use: Never        Sandoval Marshall MD  04/25/25 3742

## 2025-04-21 NOTE — PROGRESS NOTES
Adolfo Corcoran is a 59 y.o. male admitted for No Principal Problem currently listed. Pharmacy reviewed the patient's [id number] medications and allergies for accuracy.    Medications ADDED:  NON FORMULARY  albuterol 2.5 mg /3 mL (0.083 %) nebulizer solution  benzoyl peroxide 10 % gel  budesonide-glycopyr-formoterol (Breztri Aerosphere) 160-9-4.8 mcg/actuation HFA aerosol inhaler  clobetasol (Temovate) 0.05 % ointment  losartan (Cozaar) 100 mg tablet  metoprolol succinate XL (Toprol-XL) 50 mg 24 hr tablet  multivitamin/iron/folic acid (CENTRUM ORAL)  mupirocin (Bactroban) 2 % ointment  nystatin (Mycostatin) ointment  Medications CHANGED:  albuterol 90 mcg/actuation inhaler  apixaban (Eliquis) 5 mg tablet  doxycycline (Adoxa) 100 mg tablet  furosemide (Lasix) 20 mg tablet  Medications REMOVED:   None     The list below reflects the updated PTA list. Comments regarding how patient may be taking medications differently can be found in the Admit Orders Activity  Prior to Admission Medications   Prescriptions Last Dose Informant   NON FORMULARY Unknown Self   Sig: Take by mouth once daily as needed. Mullein leaf   extract drops   albuterol 2.5 mg /3 mL (0.083 %) nebulizer solution 4/21/2025 Morning Self   Sig: Take 3 mL (2.5 mg) by nebulization every 4 hours if   needed for wheezing or shortness of breath.   albuterol 90 mcg/actuation inhaler PRN Self   Sig: Inhale 1-2 puffs every 4 hours if needed for wheezing   or shortness of breath   apixaban (Eliquis) 5 mg tablet 4/18/2025 Morning Self   Sig: Take 1 tablet (5 mg) by mouth once daily.   benzoyl peroxide 10 % gel 4/20/2025 Self   Sig: Apply topically once daily.   (Wash affected areas of boils)   budesonide-glycopyr-formoterol (Breztri Aerosphere) 160-9-4.8 mcg/actuation HFA aerosol inhaler Unknown Self   Sig: Inhale 1 puff 2 times a day.   clobetasol (Temovate) 0.05 % ointment Past Week Self   Sig: Apply 1 Application topically twice a day (M-F).   Apply  to itchy rash on trunk. Use if needed   doxycycline (Adoxa) 100 mg tablet PRN Self   Sig: Take 1 tablet (100 mg) by mouth if needed   (infection). Take with a full glass of water and do not   lie down for at least 30 minutes after   furosemide (Lasix) 20 mg tablet 4/18/2025 Morning Self   Sig: Take 1 tablet (20 mg) by mouth once daily.   losartan (Cozaar) 100 mg tablet 4/18/2025 Morning Self   Sig: Take 1 tablet (100 mg) by mouth once daily.   metoprolol succinate XL (Toprol-XL) 50 mg 24 hr tablet 4/18/2025 Morning Self   Sig: Take 1.5 tablets (75 mg) by mouth once daily.   Do not crush or chew.   multivitamin/iron/folic acid (CENTRUM ORAL) 4/18/2025 Self   Sig: Take 1 tablet by mouth every other day.   mupirocin (Bactroban) 2 % ointment Past Week Self   Sig: Apply 1 Application topically once daily. Apply to   affected areas of groin and buttocks   nystatin (Mycostatin) ointment Past Week Self   Sig: Apply 1 Application topically once daily. Apply to   affected areas of groin and buttocks      Facility-Administered Medications: None        The list below reflects the updated allergy list. Please review each documented allergy for additional clarification and justification.  Allergies  Reviewed by Festus Ornelas on 4/21/2025        Severity Reactions Comments    Penicillins Low Rash             Pharmacy has been updated to [facility] [phone].    Sources used to complete the med history include:   Patient interviewed with medications in lunch bag, moderate historian, dispense report, care everywhere, chart review history    Below are additional concerns with the patient's PTA list.  **Patient states he does not take medications as prescribed but uses most medications as needed**    Festus Ornelas, [facility]  Please reach out via [employer] Secure Chat for questions

## 2025-04-21 NOTE — ED TRIAGE NOTES
Patient reports SOB for a couple days, uses albuterol, has COPD, AMA uses CPAP.  He is diaphoretic and visibly uncomfortable.

## 2025-04-22 ENCOUNTER — PATIENT OUTREACH (OUTPATIENT)
Dept: CARE COORDINATION | Facility: CLINIC | Age: 60
End: 2025-04-22
Payer: COMMERCIAL

## 2025-08-07 ENCOUNTER — HOSPITAL ENCOUNTER (EMERGENCY)
Facility: HOSPITAL | Age: 60
Discharge: HOME | End: 2025-08-07
Payer: COMMERCIAL

## 2025-08-07 VITALS
TEMPERATURE: 98.4 F | SYSTOLIC BLOOD PRESSURE: 98 MMHG | DIASTOLIC BLOOD PRESSURE: 66 MMHG | OXYGEN SATURATION: 95 % | BODY MASS INDEX: 35.79 KG/M2 | HEIGHT: 70 IN | RESPIRATION RATE: 18 BRPM | WEIGHT: 250 LBS | HEART RATE: 72 BPM

## 2025-08-07 PROCEDURE — 99281 EMR DPT VST MAYX REQ PHY/QHP: CPT

## 2025-08-07 PROCEDURE — 4500999001 HC ED NO CHARGE

## 2025-08-07 NOTE — ED NOTES
Called patient in lobby no answer.  TCT patient, patient states he left and that he was feeling better.      Bethany Harrison RN  08/07/25 [id number]

## 2025-08-08 ENCOUNTER — PATIENT OUTREACH (OUTPATIENT)
Dept: CARE COORDINATION | Facility: CLINIC | Age: 60
End: 2025-08-08
Payer: COMMERCIAL

## 2025-08-08 NOTE — PROGRESS NOTES
Outreach to patient post ED visit.  Reviewed discharge instructions, medications and the need to follow up with their primary care doctor.  At this time there are no further questions, and no further outreach is needed.  Charanjit WEEKSN, RN, [facility]  O: [phone]